# Patient Record
Sex: MALE | Race: ASIAN | Employment: FULL TIME | URBAN - METROPOLITAN AREA
[De-identification: names, ages, dates, MRNs, and addresses within clinical notes are randomized per-mention and may not be internally consistent; named-entity substitution may affect disease eponyms.]

---

## 2018-04-20 ENCOUNTER — HOSPITAL ENCOUNTER (EMERGENCY)
Facility: CLINIC | Age: 28
Discharge: HOME | End: 2018-04-20
Attending: EMERGENCY MEDICINE | Admitting: EMERGENCY MEDICINE
Payer: COMMERCIAL

## 2018-04-20 VITALS
HEART RATE: 102 BPM | WEIGHT: 315 LBS | RESPIRATION RATE: 18 BRPM | DIASTOLIC BLOOD PRESSURE: 84 MMHG | OXYGEN SATURATION: 98 % | TEMPERATURE: 98.4 F | SYSTOLIC BLOOD PRESSURE: 145 MMHG

## 2018-04-20 DIAGNOSIS — I10 ESSENTIAL HYPERTENSION, MALIGNANT: ICD-10-CM

## 2018-04-20 DIAGNOSIS — R07.89 ATYPICAL CHEST PAIN: ICD-10-CM

## 2018-04-20 LAB
ANION GAP SERPL CALCULATED.3IONS-SCNC: 9 MMOL/L (ref 3–14)
BASOPHILS # BLD AUTO: 0 10E9/L (ref 0–0.2)
BASOPHILS NFR BLD AUTO: 0.3 %
BUN SERPL-MCNC: 12 MG/DL (ref 7–30)
CALCIUM SERPL-MCNC: 8.2 MG/DL (ref 8.5–10.1)
CHLORIDE SERPL-SCNC: 106 MMOL/L (ref 94–109)
CO2 SERPL-SCNC: 23 MMOL/L (ref 20–32)
CREAT SERPL-MCNC: 0.96 MG/DL (ref 0.66–1.25)
DIFFERENTIAL METHOD BLD: NORMAL
EOSINOPHIL # BLD AUTO: 0 10E9/L (ref 0–0.7)
EOSINOPHIL NFR BLD AUTO: 0.6 %
ERYTHROCYTE [DISTWIDTH] IN BLOOD BY AUTOMATED COUNT: 13.5 % (ref 10–15)
GFR SERPL CREATININE-BSD FRML MDRD: >90 ML/MIN/1.7M2
GLUCOSE SERPL-MCNC: 177 MG/DL (ref 70–99)
HCT VFR BLD AUTO: 45.7 % (ref 40–53)
HGB BLD-MCNC: 16.1 G/DL (ref 13.3–17.7)
IMM GRANULOCYTES # BLD: 0 10E9/L (ref 0–0.4)
IMM GRANULOCYTES NFR BLD: 0.3 %
LYMPHOCYTES # BLD AUTO: 2 10E9/L (ref 0.8–5.3)
LYMPHOCYTES NFR BLD AUTO: 29.5 %
MCH RBC QN AUTO: 31.3 PG (ref 26.5–33)
MCHC RBC AUTO-ENTMCNC: 35.2 G/DL (ref 31.5–36.5)
MCV RBC AUTO: 89 FL (ref 78–100)
MONOCYTES # BLD AUTO: 0.4 10E9/L (ref 0–1.3)
MONOCYTES NFR BLD AUTO: 6 %
NEUTROPHILS # BLD AUTO: 4.2 10E9/L (ref 1.6–8.3)
NEUTROPHILS NFR BLD AUTO: 63.3 %
NRBC # BLD AUTO: 0 10*3/UL
NRBC BLD AUTO-RTO: 0 /100
PLATELET # BLD AUTO: 242 10E9/L (ref 150–450)
POTASSIUM SERPL-SCNC: 4.2 MMOL/L (ref 3.4–5.3)
RBC # BLD AUTO: 5.14 10E12/L (ref 4.4–5.9)
SODIUM SERPL-SCNC: 139 MMOL/L (ref 133–144)
TROPONIN I SERPL-MCNC: <0.015 UG/L (ref 0–0.04)
WBC # BLD AUTO: 6.7 10E9/L (ref 4–11)

## 2018-04-20 PROCEDURE — 84484 ASSAY OF TROPONIN QUANT: CPT | Performed by: EMERGENCY MEDICINE

## 2018-04-20 PROCEDURE — 93005 ELECTROCARDIOGRAM TRACING: CPT | Performed by: EMERGENCY MEDICINE

## 2018-04-20 PROCEDURE — 85025 COMPLETE CBC W/AUTO DIFF WBC: CPT | Performed by: EMERGENCY MEDICINE

## 2018-04-20 PROCEDURE — 80048 BASIC METABOLIC PNL TOTAL CA: CPT | Performed by: EMERGENCY MEDICINE

## 2018-04-20 PROCEDURE — 99284 EMERGENCY DEPT VISIT MOD MDM: CPT | Mod: 25 | Performed by: EMERGENCY MEDICINE

## 2018-04-20 PROCEDURE — 93010 ELECTROCARDIOGRAM REPORT: CPT | Mod: Z6 | Performed by: EMERGENCY MEDICINE

## 2018-04-20 PROCEDURE — 99284 EMERGENCY DEPT VISIT MOD MDM: CPT | Performed by: EMERGENCY MEDICINE

## 2018-04-20 ASSESSMENT — ENCOUNTER SYMPTOMS: NERVOUS/ANXIOUS: 1

## 2018-04-20 NOTE — ED AVS SNAPSHOT
[facility], [place], Emergency Department    [address] [facility] [address]    Phone:  [phone]                                       Ariel Davis   MRN: [id number]    Department:  [facility], Emergency Department   Date of Visit:  4/20/2018           After Visit Summary Signature Page     I have received my discharge instructions, and my questions have been answered. I have discussed any challenges I see with this plan with the nurse or doctor.    ..........................................................................................................................................  Patient/Patient Representative Signature      ..........................................................................................................................................  Patient Representative Print Name and Relationship to Patient    ..................................................               ................................................  Date                                            Time    ..........................................................................................................................................  Reviewed by Signature/Title    ...................................................              ..............................................  Date                                                            Time

## 2018-04-20 NOTE — ED AVS SNAPSHOT
[facility], Emergency Department    [address] [facility] [address]    Phone:  [phone]                                       Ariel Davis   MRN: [id number]    Department:  [facility], Emergency Department   Date of Visit:  4/20/2018           Patient Information     Date Of Birth          1990        Your diagnoses for this visit were:     Atypical chest pain        You were seen by Juan Francisco Fabian MD.        Discharge Instructions       Please make an appointment to follow up with Your Primary Care Provider as soon as possible if you have any concerns.    *CHEST PAIN, NONCARDIAC    Based on your visit today, the exact cause of your chest pain is not certain. Your condition does not seem serious and your pain does not appear to be coming from your heart. However, sometimes the signs of a serious problem take more time to appear. Therefore, please watch for the warning signs listed below.  HOME CARE:  1. Rest today and avoid strenuous activity.  2. Take any prescribed medicine as directed.  FOLLOW UP with your doctor in 1-3 days.   GET PROMPT MEDICAL ATTENTION if any of the following occur:    A change in the type of pain: if it feels different, becomes more severe, lasts longer, or begins to spread into your shoulder, arm, neck, jaw or back    Shortness of breath or increased pain with breathing    Cough with blood or dark colored sputum (phlegm)    Weakness, dizziness, or fainting    Fever over 101  F (38.3  C)    Swelling, pain or redness in one leg    9629-4061 [employer]. [address] [place] [address]. All rights reserved. This information is not intended as a substitute for professional medical care. Always follow your healthcare professional's instructions.  This information has been modified by your health care provider with permission from the publisher.      24 Hour Appointment Hotline       To make an appointment at any [facility], call  [phone] ([phone]). If you don't have a family doctor or clinic, we will help you find one. [place] clinics are conveniently located to serve the needs of you and your family.             Review of your medicines      Notice     You have not been prescribed any medications.            Procedures and tests performed during your visit     Basic metabolic panel    CBC with platelets differential    EKG 12-lead, tracing only    Troponin I      Orders Needing Specimen Collection     None      Pending Results     Date and Time Order Name Status Description    4/20/2018 2203 EKG 12-lead, tracing only Preliminary             Pending Culture Results     No orders found from 4/18/2018 to 4/21/2018.            Pending Results Instructions     If you had any lab results that were not finalized at the time of your Discharge, you can call the ED Lab Result RN at [phone]. You will be contacted by this team for any positive Lab results or changes in treatment. The nurses are available 7 days a week from 10A to 6:30P.  You can leave a message 24 hours per day and they will return your call.        Thank you for choosing [place]       Thank you for choosing [place] for your care. Our goal is always to provide you with excellent care. Hearing back from our patients is one way we can continue to improve our services. Please take a few minutes to complete the written survey that you may receive in the mail after you visit with us. Thank you!        [employer] Information     [employer] lets you send messages to your doctor, view your test results, renew your prescriptions, schedule appointments and more. To sign up, go to [email or web address] . Click on \"Log in\" on the left side of the screen, which will take you to the Welcome page. Then click on \"Sign up Now\" on the right side of the page.     You will be asked to enter the access code listed below, as well as some personal information. Please follow the directions " to create your username and password.     Your access code is: [email or web address]  Expires: 2018 11:44 PM     Your access code will  in 90 days. If you need help or a new code, please call your [facility] clinic or [phone].        Care EveryWhere ID     This is your Care EveryWhere ID. This could be used by other organizations to access your [facility] medical records  [id number]        Equal Access to Services     L[facility] Center[facility]ZANNE : Hadii aad ku hadasho Soaimeeali, waaxda luqadaha, qaybta kaalmada adeegyada, vivek denton . So [facility] [phone].    ATENCIÓN: Si habla español, tiene a harper disposición servicios gratuitos de asistencia lingüística. Llame al [phone].    We comply with applicable federal civil rights laws and [place] laws. We do not discriminate on the basis of race, color, national origin, age, disability, sex, sexual orientation, or gender identity.            After Visit Summary       This is your record. Keep this with you and show to your community pharmacist(s) and doctor(s) at your next visit.

## 2018-04-21 NOTE — ED PROVIDER NOTES
History     Chief Complaint   Patient presents with     Arm Pain     HPI  Ariel Davis is a 27 year old male with a history of HTN who presents to the ED with left arm pain. Patient states 1 hour prior to arrival he started having pulsating and shooting pain in his left arm and felt anxious about this thinking it might be cardiac related.  He denies any personal history of heart problems but states that his brother did have a heart attack at the age of 71.  He states he currently does not have this left arm pain now.    PAST MEDICAL HISTORY  Past Medical History:   Diagnosis Date     Hypertension      PAST SURGICAL HISTORY  No past surgical history on file.  FAMILY HISTORY  No family history on file.  SOCIAL HISTORY  Social History   Substance Use Topics     Smoking status: Never Smoker     Smokeless tobacco: Not on file     Alcohol use Yes      Comment: occasionally     MEDICATIONS  No current facility-administered medications for this encounter.      No current outpatient prescriptions on file.     ALLERGIES  Allergies   Allergen Reactions     Omnicef [Cefdinir]      Never exposed, positive allergy test       I have reviewed the Medications, Allergies, Past Medical and Surgical History, and Social History in the Epic system.    Review of Systems   Psychiatric/Behavioral: The patient is nervous/anxious.    All other systems reviewed and are negative.      Physical Exam   BP: (!) 165/91  Pulse: 102  Heart Rate: 99  Temp: 98.4  F (36.9  C)  Resp: 18  Weight: (!) 154.2 kg (340 lb)  SpO2: 100 %      Physical Exam   Constitutional: He is oriented to person, place, and time. He appears well-developed and well-nourished. No distress.   Moderately anxious   HENT:   Head: Normocephalic and atraumatic.   Eyes: No scleral icterus.   Neck: Normal range of motion. Neck supple.   Cardiovascular: Normal rate.    Pulmonary/Chest: Effort normal.   Neurological: He is alert and oriented to person, place, and time.   Skin:  Skin is warm and dry. No rash noted. He is not diaphoretic. No erythema. No pallor.       ED Course     ED Course     Procedures   10:22 PM  The patient was seen and examined by Dr. Fabian in Room 21.                EKG Interpretation:      Interpreted by Juan Francisco Fabian  Time reviewed:   Symptoms at time of EKG: asymptomatic   Rhythm: normal sinus   Rate: normal  Axis: normal  Ectopy: none  Conduction: normal  ST Segments/ T Waves: No ST-T wave changes  Q Waves: none  Comparison to prior: No old EKG available    Clinical Impression: normal EKG          Critical Care time:  none         Results for orders placed or performed during the hospital encounter of 04/20/18   Troponin I   Result Value Ref Range    Troponin I ES <0.015 0.000 - 0.045 ug/L   Basic metabolic panel   Result Value Ref Range    Sodium 139 133 - 144 mmol/L    Potassium 4.2 3.4 - 5.3 mmol/L    Chloride 106 94 - 109 mmol/L    Carbon Dioxide 23 20 - 32 mmol/L    Anion Gap 9 3 - 14 mmol/L    Glucose 177 (H) 70 - 99 mg/dL    Urea Nitrogen 12 7 - 30 mg/dL    Creatinine 0.96 0.66 - 1.25 mg/dL    GFR Estimate >90 >60 mL/min/1.7m2    GFR Estimate If Black >90 >60 mL/min/1.7m2    Calcium 8.2 (L) 8.5 - 10.1 mg/dL   CBC with platelets differential   Result Value Ref Range    WBC 6.7 4.0 - 11.0 10e9/L    RBC Count 5.14 4.4 - 5.9 10e12/L    Hemoglobin 16.1 13.3 - 17.7 g/dL    Hematocrit 45.7 40.0 - 53.0 %    MCV 89 78 - 100 fl    MCH 31.3 26.5 - 33.0 pg    MCHC 35.2 31.5 - 36.5 g/dL    RDW 13.5 10.0 - 15.0 %    Platelet Count 242 150 - 450 10e9/L    Diff Method Automated Method     % Neutrophils 63.3 %    % Lymphocytes 29.5 %    % Monocytes 6.0 %    % Eosinophils 0.6 %    % Basophils 0.3 %    % Immature Granulocytes 0.3 %    Nucleated RBCs 0 0 /100    Absolute Neutrophil 4.2 1.6 - 8.3 10e9/L    Absolute Lymphocytes 2.0 0.8 - 5.3 10e9/L    Absolute Monocytes 0.4 0.0 - 1.3 10e9/L    Absolute Eosinophils 0.0 0.0 - 0.7 10e9/L    Absolute Basophils 0.0 0.0 - 0.2  10e9/L    Abs Immature Granulocytes 0.0 0 - 0.4 10e9/L    Absolute Nucleated RBC 0.0    EKG 12-lead, tracing only   Result Value Ref Range    Interpretation ECG Click View Image link to view waveform and result             Labs Ordered and Resulted from Time of ED Arrival Up to the Time of Departure from the ED   BASIC METABOLIC PANEL - Abnormal; Notable for the following:        Result Value    Glucose 177 (*)     Calcium 8.2 (*)     All other components within normal limits   TROPONIN I   CBC WITH PLATELETS DIFFERENTIAL            Assessments & Plan (with Medical Decision Making)   This is a 27-year-old male coming into emergency room who had a very brief episode of some twinging muscle pain in his left arm.  He was concerned that this might be cardiac in nature.  The discomfort lasted for very brief period of time and then resolve spontaneously.  He stated that he became very anxious due to this muscle twinge and started working himself up into a panic attack.  He stated that now he feels significantly better that is in the emergency room.  All of his symptoms have resolved including his anxiety.  He still appears slightly anxious.  His EKG is normal sinus rhythm.  He has no significant past medical history.  His troponin is otherwise negative.  I do not believe that further workup needs to be provided here in the emergency room and he can follow-up with his primary care doctor for any further concerns.    I have reviewed the nursing notes.    I have reviewed the findings, diagnosis, plan and need for follow up with the patient.    There are no discharge medications for this patient.      Final diagnoses:   Atypical chest pain     IJonas, am serving as a trained medical scribe to document services personally performed by Juan Francisco Fabian MD, based on the provider's statements to me.      IJuan Francisco MD, was physically present and have reviewed and verified the accuracy of this note documented by  Jonas Cooper.     4/20/2018   [facility], [place], EMERGENCY DEPARTMENT     Juan Francisco Fabian MD  04/21/18 194

## 2018-04-21 NOTE — ED TRIAGE NOTES
Patient here with left arm pain and anxiety, is concerned it is cardiac in nature. Denies any symptoms/precipitationg factors.

## 2018-04-21 NOTE — DISCHARGE INSTRUCTIONS
Please make an appointment to follow up with Your Primary Care Provider as soon as possible if you have any concerns.    *CHEST PAIN, NONCARDIAC    Based on your visit today, the exact cause of your chest pain is not certain. Your condition does not seem serious and your pain does not appear to be coming from your heart. However, sometimes the signs of a serious problem take more time to appear. Therefore, please watch for the warning signs listed below.  HOME CARE:  1. Rest today and avoid strenuous activity.  2. Take any prescribed medicine as directed.  FOLLOW UP with your doctor in 1-3 days.   GET PROMPT MEDICAL ATTENTION if any of the following occur:    A change in the type of pain: if it feels different, becomes more severe, lasts longer, or begins to spread into your shoulder, arm, neck, jaw or back    Shortness of breath or increased pain with breathing    Cough with blood or dark colored sputum (phlegm)    Weakness, dizziness, or fainting    Fever over 101  F (38.3  C)    Swelling, pain or redness in one leg    7893-5583 [employer]. [address] [place], [place] [address]. All rights reserved. This information is not intended as a substitute for professional medical care. Always follow your healthcare professional's instructions.  This information has been modified by your health care provider with permission from the publisher.

## 2018-04-23 LAB — INTERPRETATION ECG - MUSE: NORMAL

## 2020-07-13 ENCOUNTER — TRANSCRIBE ORDERS (OUTPATIENT)
Dept: OTHER | Age: 30
End: 2020-07-13

## 2020-07-13 DIAGNOSIS — H53.129 TRANSIENT VISUAL LOSS, UNSPECIFIED EYE: Primary | ICD-10-CM

## 2020-07-15 ENCOUNTER — TELEPHONE (OUTPATIENT)
Dept: OPHTHALMOLOGY | Facility: CLINIC | Age: 30
End: 2020-07-15

## 2020-07-15 NOTE — TELEPHONE ENCOUNTER
Transient vision changes at night  Pt will wake up in middle of night and have loss of vision in one eye or other and takes few seconds before returning. Can have sunspot image in more lighted area also after waking up from sleep    No objective findings on exam/testing per patient    Dr. Harris referring to neuro-ophthalmology     Will review referral with neuro-ophthalmology team to review scheduling with neuro-ophth vs retina     Dr. Lion able to see next Tuesday    Forest Tidwell RN 1:04 PM 07/15/20              Left message with direct number at [phone]  Forest Tidwell RN 9:08 AM 07/15/20          [facility]    Phone Message    May a detailed message be left on voicemail: yes     Reason for Call: Other: Pt is referred by Art Harris from [facility] to Eye clinic for transient vision loss during the night, unspecified eye. Records and referral sent to clinic for review, thanks!     Action Taken: Message routed to:  [facility] Eye clinic    Travel Screening: Not Applicable

## 2020-08-04 ENCOUNTER — OFFICE VISIT (OUTPATIENT)
Dept: OPHTHALMOLOGY | Facility: CLINIC | Age: 30
End: 2020-08-04
Attending: OPHTHALMOLOGY
Payer: COMMERCIAL

## 2020-08-04 DIAGNOSIS — H53.40 VISUAL FIELD DEFECT: ICD-10-CM

## 2020-08-04 DIAGNOSIS — H53.40 VISUAL FIELD DEFECT: Primary | ICD-10-CM

## 2020-08-04 DIAGNOSIS — H53.129 TRANSIENT VISUAL LOSS, UNSPECIFIED LATERALITY: Primary | ICD-10-CM

## 2020-08-04 PROBLEM — H02.59 FLOPPY EYELID SYNDROME: Status: ACTIVE | Noted: 2019-03-18

## 2020-08-04 PROBLEM — R80.9 ALBUMINURIA: Status: ACTIVE | Noted: 2018-07-04

## 2020-08-04 PROBLEM — H40.003 GLAUCOMA SUSPECT OF BOTH EYES: Status: ACTIVE | Noted: 2020-08-04

## 2020-08-04 PROBLEM — G47.33 OBSTRUCTIVE SLEEP APNEA ON CPAP: Status: ACTIVE | Noted: 2018-12-05

## 2020-08-04 PROCEDURE — 92083 EXTENDED VISUAL FIELD XM: CPT | Mod: ZF | Performed by: OPHTHALMOLOGY

## 2020-08-04 PROCEDURE — G0463 HOSPITAL OUTPT CLINIC VISIT: HCPCS | Mod: ZF

## 2020-08-04 PROCEDURE — 92133 CPTRZD OPH DX IMG PST SGM ON: CPT | Mod: ZF | Performed by: OPHTHALMOLOGY

## 2020-08-04 RX ORDER — FLUOCINONIDE 0.5 MG/G
OINTMENT TOPICAL
COMMUNITY
Start: 2019-10-29

## 2020-08-04 RX ORDER — IBUPROFEN 600 MG/1
600 TABLET, FILM COATED ORAL
COMMUNITY

## 2020-08-04 RX ORDER — METHYLPHENIDATE HYDROCHLORIDE 18 MG/1
18 TABLET, EXTENDED RELEASE ORAL
COMMUNITY
Start: 2020-07-02

## 2020-08-04 RX ORDER — METHYLPHENIDATE HYDROCHLORIDE 18 MG/1
TABLET, EXTENDED RELEASE ORAL
COMMUNITY
Start: 2020-07-06

## 2020-08-04 RX ORDER — HYDROXYZINE HYDROCHLORIDE 25 MG/1
TABLET, FILM COATED ORAL
COMMUNITY
Start: 2019-10-16

## 2020-08-04 RX ORDER — ALBUTEROL SULFATE 90 UG/1
2 AEROSOL, METERED RESPIRATORY (INHALATION)
COMMUNITY
Start: 2019-04-22

## 2020-08-04 RX ORDER — SODIUM CHLORIDE 5 %
0.25 OINTMENT (GRAM) OPHTHALMIC (EYE)
COMMUNITY
Start: 2020-03-02

## 2020-08-04 RX ORDER — CICLOPIROX 80 MG/ML
SOLUTION TOPICAL
COMMUNITY
Start: 2020-02-07

## 2020-08-04 ASSESSMENT — VISUAL ACUITY
OD_CC: 20/20
OS_CC: 20/20
METHOD: SNELLEN - LINEAR

## 2020-08-04 ASSESSMENT — REFRACTION_WEARINGRX
OD_CYLINDER: +0.50
OS_SPHERE: -4.00
OD_AXIS: 080
OD_SPHERE: -4.00
OS_AXIS: 125
OS_CYLINDER: +0.50

## 2020-08-04 ASSESSMENT — TONOMETRY
OS_IOP_MMHG: 16
IOP_METHOD: ICARE
OD_IOP_MMHG: 15

## 2020-08-04 ASSESSMENT — CUP TO DISC RATIO
OS_RATIO: 0.85
OD_RATIO: 0.85

## 2020-08-04 ASSESSMENT — SLIT LAMP EXAM - LIDS
COMMENTS: NORMAL
COMMENTS: NORMAL

## 2020-08-04 ASSESSMENT — CONF VISUAL FIELD
OD_NORMAL: 1
OS_NORMAL: 1

## 2020-08-04 ASSESSMENT — EXTERNAL EXAM - RIGHT EYE: OD_EXAM: NORMAL

## 2020-08-04 ASSESSMENT — EXTERNAL EXAM - LEFT EYE: OS_EXAM: NORMAL

## 2020-08-04 NOTE — PROGRESS NOTES
Assessment & Plan     Ariel Davis is a 29 year old male with the following diagnoses:   1. Transient visual loss, unspecified laterality    2. Visual field defect       Ariel Davis is a 29 year old       male with history of glaucoma suspect who presents to neuro-ophthalmology clinic today for consultation from Dr. Harris for one year history of subjective visual disturbance    Over the past 1 year, patient has been having episodes of painless unilateral vision loss upon waking up at night. Patient will intermittently wake up at known and will describe one eye being completely dark, unable to see. Episodes last for 15-20 seconds, occurring every 1-2 weeks. Happens in either eye but only one eye at a time. Does not happen in the morning, only in the middle of the night or very early in the morning. Believes it may be the eye that he is sleeping on. Denies weakness, numbness, tingling, or sensory changes.    Describes one episode where patient fell asleep watching TV, when he woke up with partial blindness, vision in one eye was obstructed but appeared to be watching previous episode while the unaffected eye was showing the current episode.    Seen by Dr. Harris 3/2/20, glaucoma suspect based on C/D 0.8/0.8, IOP 15/15 pachy 551/553, normal unchanged OCT RNFL over 1 year, normal VF.     History of ADD on Concerta, however was having these episodes prior to restarting Concerta. Mild sleep apnea from sleep study, was told did not need CPAP. Recurrent erosion per history, uses blink QID and sleep mask.     POH: Glaucoma suspect  PMH: ADD (on Concerta), JANICE,    FH: Mother MI at age 60's, no CVA's  Meds: Concerta, albuterol    Visual acuity is 20/20. Intraocular pressure is 15/16. Pupils brisk, reactive no APD. Color plates full. Confrontational visual fields full. Motility full. Slit lamp exam normal. Dilated fundus exam enlarged C/D each eye.  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  There are no Hollenhorst plaques in either eye.  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  Visual fields normal each eye. 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 Transient monocular vision loss on awakening: a benign amaurotic phenomenon J Neuroophthalmol 2017;37:122).  This is a benign entity that occurs upon awakening.  It is much more common in women with a mean age of 45 years.  Workup is generally negative.  It usually occurs in only one eye and lasts less than 15 min.  It is not associated with future stroke or vision loss.      Patient has areas of thinning notices on carmichael glaucoma optical coherence tomography.  This would be concerning for early glaucomatous changes.  His visual field has some nonspecific changes but otherwise looks normal.  Recommend continuing to follow with Dr. Harris for possible glaucoma.      .          Attending Physician Attestation:  Complete documentation of historical and exam elements from today's encounter can be found in the full encounter summary report (not reduplicated in this progress note).  I personally obtained the chief complaint(s) and history of present illness.  I confirmed and edited as necessary the review of systems, past medical/surgical history, family history, social history, and examination findings as documented by others; and I examined the patient myself.  I personally reviewed the relevant tests, images, and reports as documented above.  I formulated and edited as necessary the assessment and plan and discussed the findings and management plan with the patient and family. I personally reviewed the ophthalmic test(s) associated with this encounter, agree with the interpretation(s) as documented by the resident/fellow, and have edited the corresponding report(s) as necessary.  - Tanner Espinosa  MD Matthew Saul MD  Ophthalmology PGY-3  [facility]  Pager: [phone]

## 2020-08-04 NOTE — PATIENT INSTRUCTIONS
Jess SERRANO ET al. Transient monocular vision loss on awakening: a benign amaurotic phenomenon J Neuroophthalmol 2017;37:122

## 2020-08-04 NOTE — NURSING NOTE
Chief Complaints and History of Present Illnesses   Patient presents with     Blurred Vision Evaluation     Chief Complaint(s) and History of Present Illness(es)     Blurred Vision Evaluation               Comments     Ariel Davis is a 29 year old male who presents today for    1. Subjective visual disturbance  Episodes of intermittent vision loss. Onset late last year. Only happens when hes sleeping, and when he wakes up at night, and always in one eye, alternates. Most recent episode was this past Saturday. When he is sleeping on the eye, it'll be completely dark. Vision always returns within 15-20 seconds. Never happened twice in a row, in one night.     Had one episode of visual disturbance. Fell asleep watching tv. When he woke up, one eye was seeing the current episode, and the affected eye had grid-like, and was seeing different images (from previous episode that was playing when he fell asleep.  Patient was told he had mild sleep apnea, and there was no need for CPAP machine.   Was diagnosed with floppy eyelid syndrome. Does not tape lids shit. Uses PFATs. Discontinued ointment. Sometimes uses sleep mask.     Randal VENEGAS 7:33 AM August 4, 2020

## 2020-08-04 NOTE — Clinical Note
8/4/2020       RE: Ariel Davis  [address]  [facility] [address]     Dear Colleague,    Thank you for referring your patient, Ariel Davis, to the EYE CLINIC at [facility]. Please see a copy of my visit note below.           Assessment & Plan     Ariel Davis is a 29 year old male with the following diagnoses:   1. Visual field defect       Ariel Davis is a 29 year old       male with history of glaucoma suspect who presents to neuro-ophthalmology clinic today for consultation from Dr. Harris for one year history of subjective visual disturbance    Over the past 1 year, patient has been having episodes of painless unilateral vision loss upon waking up at night. Patient will intermittently wake up at known and will describe one eye being completely dark, unable to see. Episodes last for 15-20 seconds, occurring every 1-2 weeks. Happens in either eye but only one eye at a time. Does not happen in the morning, only in the middle of the night or very early in the morning. Believes it may be the eye that he is sleeping on. Denies weakness, numbness, tingling, or sensory changes.    Describes one episode where patient fell asleep watching TV, when he woke up with partial blindness, vision in one eye was obstructed but appeared to be watching previous episode while the unaffected eye was showing the current episode.    Seen by Dr. Harris 3/2/20, glaucoma suspect based on C/D 0.8/0.8, IOP 15/15 pachy 551/553, normal unchanged OCT RNFL over 1 year, normal VF.     History of ADD on Concerta, however was having these episodes prior to restarting Concerta. Mild sleep apnea from sleep study, was told did not need CPAP. Recurrent erosion per history, uses blink QID and sleep mask.     POH: Glaucoma suspect  PMH: ADD (on Concerta), JANICE,    FH: Mother MI at age 60's, no CVA's  Meds: Concerta, albuterol    Visual acuity is 20/20. Intraocular  pressure is 15/16. Pupils brisk, reactive no APD. Color plates full. Confrontational visual fields full. Motility full. Slit lamp exam normal. Dilated fundus exam enlarged C/D each eye.  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      Visual fields normal each eye. OCT rNFL normal each eye.    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 Transient monocular vision loss on awakening: a benign amaurotic phenomenon J Neuroophthalmol 2017;37:122).  This is a benign entity that occurs upon awakening.  It is much more common in women with a mean age of 45 years.  Workup is generally negative.  It usually occurs in only one eye and lasts less than 15 min.  It is not associated with future stroke or vision loss.              Attending Physician Attestation:  Complete documentation of historical and exam elements from today's encounter can be found in the full encounter summary report (not reduplicated in this progress note).  I personally obtained the chief complaint(s) and history of present illness.  I confirmed and edited as necessary the review of systems, past medical/surgical history, family history, social history, and examination findings as documented by others; and I examined the patient myself.  I personally reviewed the relevant tests, images, and reports as documented above.  I formulated and edited as necessary the assessment and plan and discussed the findings and management plan with the patient and family. I personally reviewed the ophthalmic test(s) associated with this encounter, agree with the interpretation(s) as documented by the resident/fellow, and have edited the corresponding report(s) as necessary.  - Tanner Saul MD  Ophthalmology PGY-3  [facility]  Pager: [phone]          Again, thank you for allowing me to participate  in the care of your patient.      Sincerely,    Tanner Espinosa MD

## 2020-08-04 NOTE — LETTER
2020         RE:  :  MRN: Ariel Davis  1990  [id number]     Dear Dr. Harris,    Thank you for asking me to see your very pleasant patient, Ariel Davis, in neuro-ophthalmic consultation.  I would like to thank you for sending your records and I have summarized them in the history of present illness. My assessment and plan are below.  For further details, please see my attached clinic note.      Assessment & Plan     Ariel Davis is a 29 year old male with the following diagnoses:   1. Transient visual loss, unspecified laterality    2. Visual field defect       Ariel Davis is a 29 year old       male with history of glaucoma suspect who presents to neuro-ophthalmology clinic today for consultation from Dr. Harris for one year history of subjective visual disturbance    Over the past 1 year, patient has been having episodes of painless unilateral vision loss upon waking up at night. Patient will intermittently wake up at known and will describe one eye being completely dark, unable to see. Episodes last for 15-20 seconds, occurring every 1-2 weeks. Happens in either eye but only one eye at a time. Does not happen in the morning, only in the middle of the night or very early in the morning. Believes it may be the eye that he is sleeping on. Denies weakness, numbness, tingling, or sensory changes.    Describes one episode where patient fell asleep watching TV, when he woke up with partial blindness, vision in one eye was obstructed but appeared to be watching previous episode while the unaffected eye was showing the current episode.    Seen by Dr. Harris 3/2/20, glaucoma suspect based on C/D 0.8/0.8, IOP 15/15 pachy 551/553, normal unchanged OCT RNFL over 1 year, normal VF.     History of ADD on Concerta, however was having these episodes prior to restarting Concerta. Mild sleep apnea from sleep study, was told did not need CPAP. Recurrent erosion per history, uses blink QID  and sleep mask.     POH: Glaucoma suspect  PMH: ADD (on Concerta), JANICE,    FH: Mother MI at age 60's, no CVA's  Meds: Concerta, albuterol    Visual acuity is 20/20. Intraocular pressure is 15/16. Pupils brisk, reactive no APD. Color plates full. Confrontational visual fields full. Motility full. Slit lamp exam normal. Dilated fundus exam enlarged C/D each eye.  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  There are no Hollenhorst plaques in either eye.  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  Visual fields normal each eye. 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 Transient monocular vision loss on awakening: a benign amaurotic phenomenon J Neuroophthalmol 2017;37:122).  This is a benign entity that occurs upon awakening.  It is much more common in women with a mean age of 45 years.  Workup is generally negative.  It usually occurs in only one eye and lasts less than 15 min.  It is not associated with future stroke or vision loss.      Patient has areas of thinning notices on carmichael glaucoma optical coherence tomography.  This would be concerning for early glaucomatous changes.  His visual field has some nonspecific changes but otherwise looks normal.  Recommend continuing to follow with Dr. Harris for possible glaucoma.        Again, thank you for allowing me to participate in the care of your patient.      Sincerely,    Tanner Espinosa MD  Professor  Ophthalmology Residency   Director of Neuro-Ophthalmology  [employer] Endowed Chair  Departments of Ophthalmology, Neurology, and Neurosurgery  [facility] [address] [place] [address]  [address]  T - [phone]  F - [phone]  [email or web address]      CC: Art Harris MD  [facility] [address] [place] [address]  VIA Facsimile:  [phone]     Silver Alvarado MD  [place] [address] [place] [address]  VIA Facsimile: [phone]    DX = transient vision loss upon awakening

## 2020-08-04 NOTE — LETTER
2020         RE:  :  MRN: Ariel Davis  1990  [id number]     Dear Dr. Art Harris,    Thank you for asking me to see your very pleasant patient, Ariel Davis, in neuro-ophthalmic consultation.  I would like to thank you for sending your records and I have summarized them in the history of present illness. My assessment and plan are below.  For further details, please see my attached clinic note.      Assessment & Plan     Ariel Davis is a 29 year old male with the following diagnoses:   1. Visual field defect       Ariel Davis is a 29 year old       male with history of glaucoma suspect who presents to neuro-ophthalmology clinic today for consultation from Dr. Harris for one year history of subjective visual disturbance    Over the past 1 year, patient has been having episodes of painless unilateral vision loss upon waking up at night. Patient will intermittently wake up at known and will describe one eye being completely dark, unable to see. Episodes last for 15-20 seconds, occurring every 1-2 weeks. Happens in either eye but only one eye at a time. Does not happen in the morning, only in the middle of the night or very early in the morning. Believes it may be the eye that he is sleeping on. Denies weakness, numbness, tingling, or sensory changes.    Describes one episode where patient fell asleep watching TV, when he woke up with partial blindness, vision in one eye was obstructed but appeared to be watching previous episode while the unaffected eye was showing the current episode.    Seen by Dr. Harris 3/2/20, glaucoma suspect based on C/D 0.8/0.8, IOP 15/15 pachy 551/553, normal unchanged OCT RNFL over 1 year, normal VF.     History of ADD on Concerta, however was having these episodes prior to restarting Concerta. Mild sleep apnea from sleep study, was told did not need CPAP. Recurrent erosion per history, uses blink QID and sleep mask.     POH: Glaucoma  suspect  PMH: ADD (on Concerta), JANICE,    FH: Mother MI at age 60's, no CVA's  Meds: Concerta, albuterol    Visual acuity is 20/20. Intraocular pressure is 15/16. Pupils brisk, reactive no APD. Color plates full. Confrontational visual fields full. Motility full. Slit lamp exam normal. Dilated fundus exam enlarged C/D each eye.  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      Visual fields normal each eye. OCT rNFL normal each eye.    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 Transient monocular vision loss on awakening: a benign amaurotic phenomenon J Neuroophthalmol 2017;37:122).  This is a benign entity that occurs upon awakening.  It is much more common in women with a mean age of 45 years.  Workup is generally negative.  It usually occurs in only one eye and lasts less than 15 min.  It is not associated with future stroke or vision loss.            Again, thank you for allowing me to participate in the care of your patient.      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

## 2020-08-05 ENCOUNTER — TELEPHONE (OUTPATIENT)
Dept: OPHTHALMOLOGY | Facility: CLINIC | Age: 30
End: 2020-08-05

## 2020-08-05 NOTE — TELEPHONE ENCOUNTER
I called the patient back today, he said he was a bit worried about the amaurosis upon walking up. I reassured him that it is a benign phenomenon and I educated him to contact us if his symptoms worsens

## 2021-01-15 ENCOUNTER — HEALTH MAINTENANCE LETTER (OUTPATIENT)
Age: 31
End: 2021-01-15

## 2021-09-25 ENCOUNTER — HEALTH MAINTENANCE LETTER (OUTPATIENT)
Age: 31
End: 2021-09-25

## 2022-03-12 ENCOUNTER — HEALTH MAINTENANCE LETTER (OUTPATIENT)
Age: 32
End: 2022-03-12

## 2023-01-07 ENCOUNTER — HEALTH MAINTENANCE LETTER (OUTPATIENT)
Age: 33
End: 2023-01-07

## 2023-04-22 ENCOUNTER — HEALTH MAINTENANCE LETTER (OUTPATIENT)
Age: 33
End: 2023-04-22